# Patient Record
Sex: MALE | Race: WHITE | NOT HISPANIC OR LATINO | URBAN - METROPOLITAN AREA
[De-identification: names, ages, dates, MRNs, and addresses within clinical notes are randomized per-mention and may not be internally consistent; named-entity substitution may affect disease eponyms.]

---

## 2024-08-22 ENCOUNTER — APPOINTMENT (OUTPATIENT)
Dept: UROLOGY | Facility: CLINIC | Age: 74
End: 2024-08-22

## 2024-08-22 VITALS — WEIGHT: 171 LBS | HEIGHT: 70 IN | TEMPERATURE: 97.5 F | BODY MASS INDEX: 24.48 KG/M2

## 2024-08-22 DIAGNOSIS — C67.9 MALIGNANT NEOPLASM OF URINARY BLADDER, UNSPECIFIED SITE (MULTI): ICD-10-CM

## 2024-08-22 DIAGNOSIS — N40.0 BENIGN PROSTATIC HYPERPLASIA WITHOUT LOWER URINARY TRACT SYMPTOMS: ICD-10-CM

## 2024-08-22 DIAGNOSIS — Z79.2 PROPHYLACTIC ANTIBIOTIC: ICD-10-CM

## 2024-08-22 LAB
POC APPEARANCE, URINE: CLEAR
POC BILIRUBIN, URINE: NEGATIVE
POC BLOOD, URINE: NEGATIVE
POC COLOR, URINE: YELLOW
POC GLUCOSE, URINE: NEGATIVE MG/DL
POC KETONES, URINE: NEGATIVE MG/DL
POC LEUKOCYTES, URINE: NEGATIVE
POC NITRITE,URINE: NEGATIVE
POC PH, URINE: 7 PH
POC PROTEIN, URINE: NEGATIVE MG/DL
POC SPECIFIC GRAVITY, URINE: 1.01
POC UROBILINOGEN, URINE: 0.2 EU/DL

## 2024-08-22 PROCEDURE — 52000 CYSTOURETHROSCOPY: CPT | Performed by: UROLOGY

## 2024-08-22 PROCEDURE — 81003 URINALYSIS AUTO W/O SCOPE: CPT | Performed by: UROLOGY

## 2024-08-22 PROCEDURE — 99213 OFFICE O/P EST LOW 20 MIN: CPT | Performed by: UROLOGY

## 2024-08-22 RX ORDER — LANCETS 33 GAUGE
EACH MISCELLANEOUS
COMMUNITY
Start: 2024-08-21

## 2024-08-22 RX ORDER — ATORVASTATIN CALCIUM 20 MG/1
20 TABLET, FILM COATED ORAL DAILY
COMMUNITY

## 2024-08-22 RX ORDER — ALFUZOSIN HYDROCHLORIDE 10 MG/1
10 TABLET, EXTENDED RELEASE ORAL DAILY
COMMUNITY
End: 2024-08-23 | Stop reason: SDUPTHER

## 2024-08-22 RX ORDER — METFORMIN HYDROCHLORIDE 750 MG/1
1500 TABLET, EXTENDED RELEASE ORAL
COMMUNITY

## 2024-08-22 ASSESSMENT — PAIN SCALES - GENERAL: PAINLEVEL: 0-NO PAIN

## 2024-08-22 NOTE — PROGRESS NOTES
"  Patient is a 74 y.o. male presenting with a history of bladder cancer    SUBJECTIVE:  HPI   He feels well.  He continues alfuzosin for BPH.      No results found for: \"URINECULTURE\"     No past medical history on file.   No past surgical history on file.   No family history on file.   Social History     Socioeconomic History    Marital status:    Tobacco Use    Smoking status: Never    Smokeless tobacco: Never   Substance and Sexual Activity    Alcohol use: Not Currently    Drug use: Never     Social Determinants of Health     Financial Resource Strain: Low Risk  (7/13/2022)    Received from [facility]    Overall Financial Resource Strain (CARDIA)     Difficulty of Paying Living Expenses: Not very hard   Food Insecurity: No Food Insecurity (7/13/2022)    Received from [facility]    Hunger Vital Sign     Worried About Running Out of Food in the Last Year: Never true     Ran Out of Food in the Last Year: Never true   Transportation Needs: No Transportation Needs (7/13/2022)    Received from [facility]    PRAPARE - Transportation     Lack of Transportation (Medical): No     Lack of Transportation (Non-Medical): No   Physical Activity: Insufficiently Active (7/13/2022)    Received from [facility]    Exercise Vital Sign     Days of Exercise per Week: 4 days     Minutes of Exercise per Session: 20 min   Stress: No Stress Concern Present (7/13/2022)    Received from [facility]    [place] [employer] - Occupational Stress Questionnaire     Feeling of Stress : Not at all   Social Connections: Unknown (7/13/2022)    Received from [facility]    Social Connection and Isolation Panel [NHANES]     Frequency of Communication with Friends and Family: Three times a week     Frequency of Social Gatherings with Friends and Family: Once a week     Attends Shinto Services: Patient declined     Active Member of Clubs or Organizations: Yes     Attends Club or " Organization Meetings: More than 4 times per year     Marital Status:         Review of Systems   Constitutional: denies any unintentional weight loss or change in strength.  Integumentary: denies any rashes or pruritus.  Eyes: denies any double vision or eye pain.  Ear/Nose/Mouth/Throat: denies any nosebleeds or gum bleeds.  Cardiovascular: denies any chest pain or syncope.  Respiratory: denies hemoptysis.  Gastrointestinal: denies nausea or vomiting.  Musculoskeletal: denies muscle cramping or weakness.  Neurologic: denies convulsions or seizures.  Hematologic/Lymphatic: denies bleeding tendencies.  Endocrine: denies heat/cold intolerance.  All other systems have been reviewed and are negative unless otherwise noted in the HPI.    OBJECTIVE:  Visit Vitals  Temp 36.4 °C (97.5 °F)     Physical Exam   Constitutional: No obvious distress.  Eyes: Non-injected conjunctiva, sclera clear, EOMI.  Ears/Nose/Mouth/Throat: No obvious drainage per ears or nose.  Cardiovascular: Extremities are warm and well perfused. No edema, cyanosis or pallor.  Respiratory: No audible wheezing/stridor; respirations do not appear labored.  Gastrointestinal: Abdomen soft, not distended.  Musculoskeletal: Normal ROM of extremities.  Skin: No obvious rashes or open sores.  Neurologic: Alert and oriented, CN 2-12 grossly intact.  Psychiatric: Answers questions appropriately with normal affect.  Hematologic/Lymphatic/Immunologic: No obvious bruises or sites of spontaneous bleeding.  Genitourinary: No CVA tenderness, bladder not palpable.     Labs:  Lab Results   Component Value Date    WBC 3.9 (L) 02/04/2020    HGB 14.5 02/04/2020    HCT 44.2 02/04/2020     (L) 02/04/2020    CHOL 140 02/04/2020    TRIG 62 02/04/2020    HDL 43 02/04/2020    ALT 26 02/04/2020    AST 22 02/04/2020     02/04/2020    K 4.7 02/04/2020     02/04/2020    CREATININE 0.9 02/04/2020    BUN 15 02/04/2020    CO2 24 02/04/2020    PSA 2.1 02/04/2020     "HGBA1C 6.3 (H) 02/19/2024     No results found for: \"KPSAT\", \"KPSAP\"  IMAGING:        PROCEDURES:    Cystoscopy    Date/Time: 8/22/2024 4:13 PM    Performed by: Riaz Alvarez MD  Authorized by: Riaz Alvarez MD    Procedure - Bladder Cystoscopy:     Procedure details: cystoscopy    Comments:      No tumors or stones present.          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  He had focal atypia on biopsy subsequently, which resolved on repeat biopsy in May 2023.  He has been treated with BCG.    Cystoscopy today was negative.    He will be scheduled for 3 follow-up BCG treatments.    He will continue alfuzosin for BPH.  Prescription provided today.    All questions were answered to the patient’s satisfaction.  Patient agrees with the plan and wishes to proceed.  Follow-up will be scheduled appropriately.     Riaz Alvarez MD  "

## 2024-08-23 RX ORDER — CEPHALEXIN 500 MG/1
500 CAPSULE ORAL ONCE
Qty: 1 CAPSULE | Refills: 0 | Status: SHIPPED | OUTPATIENT
Start: 2024-08-23 | End: 2024-08-23

## 2024-08-23 RX ORDER — ALFUZOSIN HYDROCHLORIDE 10 MG/1
10 TABLET, EXTENDED RELEASE ORAL DAILY
Qty: 90 TABLET | Refills: 3 | Status: SHIPPED | OUTPATIENT
Start: 2024-08-23

## 2024-09-18 ENCOUNTER — APPOINTMENT (OUTPATIENT)
Dept: UROLOGY | Facility: CLINIC | Age: 74
End: 2024-09-18
Payer: MEDICARE

## 2024-09-18 VITALS — HEIGHT: 70 IN | WEIGHT: 168 LBS | BODY MASS INDEX: 24.05 KG/M2 | TEMPERATURE: 97 F

## 2024-09-18 DIAGNOSIS — N13.8 BPH WITH OBSTRUCTION/LOWER URINARY TRACT SYMPTOMS: Primary | ICD-10-CM

## 2024-09-18 DIAGNOSIS — N40.1 BPH WITH OBSTRUCTION/LOWER URINARY TRACT SYMPTOMS: Primary | ICD-10-CM

## 2024-09-18 LAB
POC APPEARANCE, URINE: CLEAR
POC BILIRUBIN, URINE: NEGATIVE
POC BLOOD, URINE: NEGATIVE
POC COLOR, URINE: YELLOW
POC GLUCOSE, URINE: NEGATIVE MG/DL
POC KETONES, URINE: NEGATIVE MG/DL
POC LEUKOCYTES, URINE: NEGATIVE
POC NITRITE,URINE: NEGATIVE
POC PH, URINE: 5.5 PH
POC PROTEIN, URINE: NEGATIVE MG/DL
POC SPECIFIC GRAVITY, URINE: <=1.005
POC UROBILINOGEN, URINE: 0.2 EU/DL

## 2024-09-18 ASSESSMENT — PAIN SCALES - GENERAL: PAINLEVEL: 0-NO PAIN

## 2024-09-18 NOTE — PROGRESS NOTES
Patient here for BCG 1/3 maintnance.  Patient denies UTI s/s.  Urine dipped I/O prior to administration of BCG.  Patient denies being on antibiotics or steroids.  Informed consent completed by provider prior to start of tx series.  Written education on BCG given to patient, along with nurse callback number.  Educated patient on 2 hour dwell time, and bleach protocol in toilet x 6 hours.  14F coude catheter inserted by sterile technique. Urine drained yellow about 100 ml.  50mg BCG reconstituted in 50 mL of normal saline administered intravesically through catheter.  Catheter removed after administration of BCG.  Patient tolerated procedure well.  NS  NDC: 6177-8775-50  Lot: [id number]  Expires: 5/1/25

## 2024-09-25 ENCOUNTER — APPOINTMENT (OUTPATIENT)
Dept: UROLOGY | Facility: CLINIC | Age: 74
End: 2024-09-25
Payer: MEDICARE

## 2024-09-25 DIAGNOSIS — C67.9 MALIGNANT NEOPLASM OF URINARY BLADDER, UNSPECIFIED SITE (MULTI): Primary | ICD-10-CM

## 2024-09-25 LAB
POC APPEARANCE, URINE: CLEAR
POC BILIRUBIN, URINE: NEGATIVE
POC BLOOD, URINE: NEGATIVE
POC COLOR, URINE: YELLOW
POC GLUCOSE, URINE: NEGATIVE MG/DL
POC KETONES, URINE: NEGATIVE MG/DL
POC LEUKOCYTES, URINE: NEGATIVE
POC NITRITE,URINE: NEGATIVE
POC PH, URINE: 6 PH
POC PROTEIN, URINE: NEGATIVE MG/DL
POC SPECIFIC GRAVITY, URINE: 1.02
POC UROBILINOGEN, URINE: 0.2 EU/DL

## 2024-09-25 PROCEDURE — 81003 URINALYSIS AUTO W/O SCOPE: CPT | Performed by: NURSE PRACTITIONER

## 2024-09-25 PROCEDURE — 51720 TREATMENT OF BLADDER LESION: CPT | Performed by: NURSE PRACTITIONER

## 2024-09-25 NOTE — PROGRESS NOTES
Patient here for BCG 2/3 maintnance.  Patient denies UTI s/s.  Urine dipped I/O prior to administration of BCG.  Patient denies being on antibiotics or steroids.  Informed consent completed by provider prior to start of tx series.  Written education on BCG given to patient, along with nurse callback number.  Educated patient on 2 hour dwell time, and bleach protocol in toilet x 6 hours.  14F coude catheter inserted by sterile technique. Urine drained yellow about 100 ml.  50mg BCG reconstituted in 50 mL of normal saline administered intravesically through catheter.  Catheter removed after administration of BCG.  Patient tolerated procedure well.  NS  NDC: 6526-2989-42  Lot: [id number]  Expires: 5/1/25

## 2024-10-02 ENCOUNTER — APPOINTMENT (OUTPATIENT)
Dept: UROLOGY | Facility: CLINIC | Age: 74
End: 2024-10-02
Payer: MEDICARE

## 2024-10-02 VITALS — TEMPERATURE: 97.7 F | WEIGHT: 166.6 LBS | HEIGHT: 70 IN | BODY MASS INDEX: 23.85 KG/M2

## 2024-10-02 DIAGNOSIS — C67.9 MALIGNANT NEOPLASM OF URINARY BLADDER, UNSPECIFIED SITE (MULTI): Primary | ICD-10-CM

## 2024-10-02 LAB
POC APPEARANCE, URINE: CLEAR
POC BILIRUBIN, URINE: NEGATIVE
POC BLOOD, URINE: NEGATIVE
POC COLOR, URINE: YELLOW
POC GLUCOSE, URINE: NEGATIVE MG/DL
POC KETONES, URINE: NEGATIVE MG/DL
POC LEUKOCYTES, URINE: NEGATIVE
POC NITRITE,URINE: NEGATIVE
POC PH, URINE: 5.5 PH
POC PROTEIN, URINE: ABNORMAL MG/DL
POC SPECIFIC GRAVITY, URINE: >=1.03
POC UROBILINOGEN, URINE: 0.2 EU/DL

## 2024-10-02 PROCEDURE — 81002 URINALYSIS NONAUTO W/O SCOPE: CPT | Performed by: PHYSICIAN ASSISTANT

## 2024-10-02 PROCEDURE — 51720 TREATMENT OF BLADDER LESION: CPT | Performed by: PHYSICIAN ASSISTANT

## 2024-10-02 ASSESSMENT — PAIN SCALES - GENERAL: PAINLEVEL: 0-NO PAIN

## 2024-10-03 NOTE — PROGRESS NOTES
Patient here for BCG 3/3 maintnance.  Patient denies UTI s/s.  Urine dipped I/O prior to administration of BCG.  Patient denies being on antibiotics or steroids.  Informed consent completed by provider prior to start of tx series.  Written education on BCG given to patient, along with nurse callback number.  Educated patient on 2 hour dwell time, and bleach protocol in toilet x 6 hours.  14F coude catheter inserted by sterile technique. Urine drained yellow about 50 ml.  50mg BCG reconstituted in 50 mL of normal saline administered intravesically through catheter.  Catheter removed after administration of BCG.  Patient tolerated procedure well.    Please schedule cystoscopy in 4-6 weeks     NS  NDC: 4715-6482-90  Lot: [id number]  Expires: 5/1/25

## 2024-11-07 ENCOUNTER — APPOINTMENT (OUTPATIENT)
Dept: UROLOGY | Facility: CLINIC | Age: 74
End: 2024-11-07
Payer: MEDICARE

## 2024-11-07 VITALS
HEIGHT: 68 IN | HEART RATE: 57 BPM | TEMPERATURE: 97.2 F | SYSTOLIC BLOOD PRESSURE: 160 MMHG | DIASTOLIC BLOOD PRESSURE: 72 MMHG | WEIGHT: 168.2 LBS | BODY MASS INDEX: 25.49 KG/M2

## 2024-11-07 DIAGNOSIS — C67.9 MALIGNANT NEOPLASM OF URINARY BLADDER, UNSPECIFIED SITE (MULTI): ICD-10-CM

## 2024-11-07 LAB
POC APPEARANCE, URINE: CLEAR
POC BILIRUBIN, URINE: NEGATIVE
POC BLOOD, URINE: NEGATIVE
POC COLOR, URINE: YELLOW
POC GLUCOSE, URINE: NEGATIVE MG/DL
POC KETONES, URINE: NEGATIVE MG/DL
POC LEUKOCYTES, URINE: NEGATIVE
POC NITRITE,URINE: NEGATIVE
POC PH, URINE: 5.5 PH
POC PROTEIN, URINE: NEGATIVE MG/DL
POC SPECIFIC GRAVITY, URINE: 1.02
POC UROBILINOGEN, URINE: 0.2 EU/DL

## 2024-11-07 PROCEDURE — 81003 URINALYSIS AUTO W/O SCOPE: CPT | Performed by: UROLOGY

## 2024-11-07 PROCEDURE — 88112 CYTOPATH CELL ENHANCE TECH: CPT | Performed by: PATHOLOGY

## 2024-11-07 PROCEDURE — 88112 CYTOPATH CELL ENHANCE TECH: CPT

## 2024-11-07 PROCEDURE — 52000 CYSTOURETHROSCOPY: CPT | Performed by: UROLOGY

## 2024-11-07 ASSESSMENT — PAIN SCALES - GENERAL: PAINLEVEL_OUTOF10: 0-NO PAIN

## 2024-11-07 NOTE — PATIENT INSTRUCTIONS
Please call Radiology scheduling at [phone]  Your provider has ordered blood work to be drawn.  Please obtain your labs at any  facility.

## 2024-11-07 NOTE — PROGRESS NOTES
"  Patient is a 74 y.o. male presenting with a history of bladder cancer    SUBJECTIVE:  HPI   He feels well.    He finished BCG.  He continues alfuzosin for BPH.      No results found for: \"URINECULTURE\"     No past medical history on file.   No past surgical history on file.   No family history on file.   Social History     Socioeconomic History    Marital status:    Tobacco Use    Smoking status: Never    Smokeless tobacco: Never   Substance and Sexual Activity    Alcohol use: Not Currently    Drug use: Never     Social Drivers of Health     Financial Resource Strain: Low Risk  (7/13/2022)    Received from [facility]    Overall Financial Resource Strain (CARDIA)     Difficulty of Paying Living Expenses: Not very hard   Food Insecurity: No Food Insecurity (7/13/2022)    Received from [facility]    Hunger Vital Sign     Worried About Running Out of Food in the Last Year: Never true     Ran Out of Food in the Last Year: Never true   Transportation Needs: No Transportation Needs (7/13/2022)    Received from [facility]    PRAPARE - Transportation     Lack of Transportation (Medical): No     Lack of Transportation (Non-Medical): No   Physical Activity: Insufficiently Active (7/13/2022)    Received from [facility]    Exercise Vital Sign     Days of Exercise per Week: 4 days     Minutes of Exercise per Session: 20 min   Stress: No Stress Concern Present (7/13/2022)    Received from [facility]    [employer] [place] [employer]ational [employer] - Occupational Stress Questionnaire     Feeling of Stress : Not at all   Social Connections: Unknown (7/13/2022)    Received from [facility]    Social Connection and Isolation Panel [NHANES]     Frequency of Communication with Friends and Family: Three times a week     Frequency of Social Gatherings with Friends and Family: Once a week     Attends Caodaism Services: Patient declined     Active Member of Clubs or Organizations: Yes     Attends Club " or Organization Meetings: More than 4 times per year     Marital Status:         Review of Systems   Constitutional: denies any unintentional weight loss or change in strength.  Integumentary: denies any rashes or pruritus.  Eyes: denies any double vision or eye pain.  Ear/Nose/Mouth/Throat: denies any nosebleeds or gum bleeds.  Cardiovascular: denies any chest pain or syncope.  Respiratory: denies hemoptysis.  Gastrointestinal: denies nausea or vomiting.  Musculoskeletal: denies muscle cramping or weakness.  Neurologic: denies convulsions or seizures.  Hematologic/Lymphatic: denies bleeding tendencies.  Endocrine: denies heat/cold intolerance.  All other systems have been reviewed and are negative unless otherwise noted in the HPI.    OBJECTIVE:  Visit Vitals  /72   Pulse 57   Temp 36.2 °C (97.2 °F)     Physical Exam   Constitutional: No obvious distress.  Eyes: Non-injected conjunctiva, sclera clear, EOMI.  Ears/Nose/Mouth/Throat: No obvious drainage per ears or nose.  Cardiovascular: Extremities are warm and well perfused. No edema, cyanosis or pallor.  Respiratory: No audible wheezing/stridor; respirations do not appear labored.  Gastrointestinal: Abdomen soft, not distended.  Musculoskeletal: Normal ROM of extremities.  Skin: No obvious rashes or open sores.  Neurologic: Alert and oriented, CN 2-12 grossly intact.  Psychiatric: Answers questions appropriately with normal affect.  Hematologic/Lymphatic/Immunologic: No obvious bruises or sites of spontaneous bleeding.  Genitourinary: No CVA tenderness, bladder not palpable.     Labs:  Lab Results   Component Value Date    WBC 3.9 (L) 02/04/2020    HGB 14.5 02/04/2020    HCT 44.2 02/04/2020     (L) 02/04/2020    CHOL 140 02/04/2020    TRIG 62 02/04/2020    HDL 43 02/04/2020    ALT 26 02/04/2020    AST 22 02/04/2020     02/04/2020    K 4.7 02/04/2020     02/04/2020    CREATININE 0.9 02/04/2020    BUN 15 02/04/2020    CO2 24  "02/04/2020    PSA 2.1 02/04/2020    HGBA1C 6.3 (H) 02/19/2024     No results found for: \"KPSAT\", \"KPSAP\"  IMAGING:        PROCEDURES:    Cystoscopy    Date/Time: 11/10/2024 4:30 PM    Performed by: Riaz Alvarez MD  Authorized by: Riaz Alvarez MD      Comments:      No bladder tumors or stones           ASSESSMENT/PLAN:  Problem List Items Addressed This Visit    None  Visit Diagnoses       Malignant neoplasm of urinary bladder, unspecified site (Multi)        Relevant Orders    POCT UA Automated manually resulted (Completed)           He has a history of a high-grade TA urothelial carcinoma of the bladder diagnosed in May 2021.  He had focal atypia on biopsy subsequently, which resolved on repeat biopsy in May 2023.  He has been treated with BCG.   A CT urogram was ordered.  Cytology sent.    Cystoscopy today was negative.    He will continue alfuzosin for BPH.  Monitor obstructive symptoms    All questions were answered to the patient’s satisfaction.  Patient agrees with the plan and wishes to proceed.  Follow-up will be scheduled appropriately.     Riaz Alvarez MD  "

## 2024-11-11 LAB
LABORATORY COMMENT REPORT: NORMAL
LABORATORY COMMENT REPORT: NORMAL
PATH REPORT.FINAL DX SPEC: NORMAL
PATH REPORT.GROSS SPEC: NORMAL
PATH REPORT.RELEVANT HX SPEC: NORMAL
PATH REPORT.TOTAL CANCER: NORMAL

## 2024-12-06 ENCOUNTER — HOSPITAL ENCOUNTER (OUTPATIENT)
Dept: RADIOLOGY | Facility: HOSPITAL | Age: 74
Discharge: HOME | End: 2024-12-06
Payer: MEDICARE

## 2024-12-06 ENCOUNTER — TELEPHONE (OUTPATIENT)
Dept: UROLOGY | Facility: HOSPITAL | Age: 74
End: 2024-12-06
Payer: MEDICARE

## 2024-12-06 DIAGNOSIS — C67.9 MALIGNANT NEOPLASM OF BLADDER, UNSPECIFIED: ICD-10-CM

## 2024-12-06 DIAGNOSIS — C67.9 MALIGNANT NEOPLASM OF BLADDER, UNSPECIFIED: Primary | ICD-10-CM

## 2024-12-06 DIAGNOSIS — C67.9 MALIGNANT NEOPLASM OF URINARY BLADDER, UNSPECIFIED SITE (MULTI): ICD-10-CM

## 2024-12-06 LAB
CREAT SERPL-MCNC: 0.89 MG/DL (ref 0.5–1.3)
EGFRCR SERPLBLD CKD-EPI 2021: 90 ML/MIN/1.73M*2

## 2024-12-06 PROCEDURE — 82565 ASSAY OF CREATININE: CPT | Performed by: UROLOGY

## 2024-12-06 PROCEDURE — 76377 3D RENDER W/INTRP POSTPROCES: CPT

## 2024-12-06 PROCEDURE — 36415 COLL VENOUS BLD VENIPUNCTURE: CPT | Performed by: UROLOGY

## 2024-12-06 PROCEDURE — 2550000001 HC RX 255 CONTRASTS: Performed by: UROLOGY

## 2024-12-06 NOTE — TELEPHONE ENCOUNTER
We discussed his CT.  He will follow up with GI.  He will return here in 2/25 for repeat cystoscopy and cytology.

## 2025-02-25 NOTE — PROGRESS NOTES
"  Patient is a 74 y.o. male presenting with a history of bladder cancer    SUBJECTIVE:  HPI   He presents for surveillance cystoscopy.  He finished BCG.  He continues alfuzosin for BPH.  He has no urinary complaints today.     He has colon cancer incidentally found on CT urogram in 12/24.   He underwent laparoscopic partial colectomy with anastomosis on 1/21/25.  He is currently participating in a study.    No past medical history on file.   No past surgical history on file.   No family history on file.     Review of Systems     OBJECTIVE:  Visit Vitals  /71   Pulse 71   Temp 36.4 °C (97.6 °F)       Physical Exam     Labs:  Lab Results   Component Value Date    WBC 3.9 (L) 02/04/2020    HGB 14.5 02/04/2020    HCT 44.2 02/04/2020    MCV 93.4 02/04/2020     (L) 02/04/2020   PSA  10/10/24  2.29  2/19/24  2.68  Lab Results   Component Value Date    PSA 2.1 02/04/2020     No results found for: \"URINECULTURE\"    IMAGING:   === 12/06/24 ===  CT UROGRAPHY WITH 3D VOLUME RENDERED IMAGING  - Impression -  1.  Incidental enhancing process inseparable from the expected  location of the cecum. The finding is suspicious for a cecal region  mass. Further evaluation recommended.  2. No evident urinary bladder mass.  3. Probably benign kidney cysts.  4. Gallstone.    Signed by: Ike Paniagua 12/6/2024 2:14 PM  Dictation workstation:   [id number]     PROCEDURES:  Cystoscopy    Date/Time: 2/27/2025 10:52 AM    Performed by: Riaz Alvarez MD  Authorized by: Riaz Alvarez MD    Procedure - Bladder Cystoscopy:     Procedure details: cystoscopy    Post-procedure:     Patient tolerance: Patient tolerated the procedure well with no immediate complications      Comments:      Urethra: No urethral lesions.  Bladder: No bladder tumors or stones.       ASSESSMENT/PLAN:  Problem List Items Addressed This Visit    None  Visit Diagnoses       Urinary symptom or sign    -  Primary    Relevant Orders    POCT UA Automated " manually resulted (Completed)    Malignant neoplasm of urinary bladder, unspecified site (Multi)              He has a history of a high-grade TA urothelial carcinoma of the bladder diagnosed in May 2021.  He had focal atypia on biopsy subsequently, which resolved on repeat biopsy in May 2023.  He has been treated with BCG. His cystoscopy was negative today 2/27/25. Urine will be sent for cytology. He will follow up in 3 months for repeat cystoscopy.   He will take 2 doses of Keflex for prophylaxis.    He will continue alfuzosin for BPH.  Monitor obstructive symptoms    All questions were answered to the patient’s satisfaction.  Patient agrees with the plan and wishes to proceed.  Follow-up will be scheduled appropriately.     Scribe Attestation  By signing my name below, I, Cristi Eaton,   attest that this documentation has been prepared under the direction and in the presence of Riaz Alvarez MD.

## 2025-02-27 ENCOUNTER — APPOINTMENT (OUTPATIENT)
Dept: UROLOGY | Facility: CLINIC | Age: 75
End: 2025-02-27
Payer: MEDICARE

## 2025-02-27 VITALS
TEMPERATURE: 97.6 F | DIASTOLIC BLOOD PRESSURE: 71 MMHG | WEIGHT: 156 LBS | SYSTOLIC BLOOD PRESSURE: 135 MMHG | HEART RATE: 71 BPM | HEIGHT: 69 IN | BODY MASS INDEX: 23.11 KG/M2

## 2025-02-27 DIAGNOSIS — Z85.51 HISTORY OF BLADDER CANCER: ICD-10-CM

## 2025-02-27 DIAGNOSIS — R39.12 BENIGN PROSTATIC HYPERPLASIA WITH WEAK URINARY STREAM: ICD-10-CM

## 2025-02-27 DIAGNOSIS — N40.1 BENIGN PROSTATIC HYPERPLASIA WITH WEAK URINARY STREAM: ICD-10-CM

## 2025-02-27 DIAGNOSIS — R39.9 URINARY SYMPTOM OR SIGN: ICD-10-CM

## 2025-02-27 DIAGNOSIS — C67.9 MALIGNANT NEOPLASM OF URINARY BLADDER, UNSPECIFIED SITE (MULTI): Primary | ICD-10-CM

## 2025-02-27 PROCEDURE — 88112 CYTOPATH CELL ENHANCE TECH: CPT | Performed by: PATHOLOGY

## 2025-02-27 PROCEDURE — 52000 CYSTOURETHROSCOPY: CPT | Performed by: UROLOGY

## 2025-02-27 PROCEDURE — 81003 URINALYSIS AUTO W/O SCOPE: CPT | Performed by: UROLOGY

## 2025-02-27 PROCEDURE — 88112 CYTOPATH CELL ENHANCE TECH: CPT

## 2025-02-27 ASSESSMENT — PAIN SCALES - GENERAL: PAINLEVEL_OUTOF10: 0-NO PAIN

## 2025-05-30 NOTE — PROGRESS NOTES
Patient is a 74 y.o. male presenting for surveillance cystoscopy with a PMH of high-grade TA urothelial carcinoma of the bladder.    SUBJECTIVE:  HPI   He presents for surveillance cystoscopy.  He finished BCG.  He continues alfuzosin for BPH.    He has colon cancer incidentally found on CT urogram in 12/24.   He underwent laparoscopic partial colectomy with anastomosis on 1/21/25.  He is currently participating in a study.    Medical History[1]  Surgical History[2]  Family History[3]  Social History[4]    Review of Systems   All systems have been reviewed and are negative unless otherwise noted in the HPI.    OBJECTIVE:  Visit Vitals  /55 (BP Location: Left arm, Patient Position: Sitting, BP Cuff Size: Adult)   Pulse 61   Temp 36.4 °C (97.5 °F)     Physical Exam   Constitutional: No obvious distress.  Cardiovascular: Extremities are warm and well perfused. No edema, cyanosis or pallor.  Respiratory: No audible wheezing/stridor; respirations do not appear labored.  Neurologic: Alert and oriented, CN 2-12 grossly intact.  Psychiatric: Answers questions appropriately with normal affect.    Labs:  Lab Results   Component Value Date    WBC 3.9 (L) 02/04/2020    HGB 14.5 02/04/2020    HCT 44.2 02/04/2020    MCV 93.4 02/04/2020     (L) 02/04/2020    GLUCOSE 193 (H) 02/04/2020    CALCIUM 9.9 02/04/2020     02/04/2020    K 4.7 02/04/2020    CO2 24 02/04/2020     02/04/2020    BUN 15 02/04/2020    CREATININE 0.89 12/06/2024    EGFR 90 12/06/2024    PSA 2.1 02/04/2020   Urine cytology 2/27/25  Final Cytological Interpretation      A. URINE CLEAN CATCH                 Atypical urothelial cells are present                Polyoma virus cytopathic changes noted      Electronically signed by Ary Brewer MD on 3/3/2025 at 1415 EST     IMAGING:  Contrasted CT AP 02/11/25  IMPRESSION:   -No abdominal mass density or lymphadenopathy   -Postsurgical changes consistent with partial right colectomy.  Slight    thickening in the small bowel wall at the area of anastomosis   -Diverticulosis   -Gallstones/no biliary dilatation   -Prostate prominence   -Slight bladder wall thickening/correlate with clinical history      === 12/06/24 ===  CT UROGRAPHY WITH 3D VOLUME RENDERED IMAGING  - Impression -  1.  Incidental enhancing process inseparable from the expected  location of the cecum. The finding is suspicious for a cecal region  mass. Further evaluation recommended.  2. No evident urinary bladder mass.  3. Probably benign kidney cysts.  4. Gallstone.    PROCEDURES:  Cystoscopy    Date/Time: 6/2/2025 9:54 AM    Performed by: iRaz Alvarez MD  Authorized by: Riaz Alvarez MD    Procedure - Bladder Cystoscopy:     Procedure details: cystoscopy    Post-procedure:     Patient tolerance: Patient tolerated the procedure well with no immediate complications      Comments:      Risks of infection and bleeding reviewed.  Urethra: normal course and caliber, no evidence of stricture or lesion.  Prostate: enlarged lateral lobes of the prostate with some extension into the bladder.  Bladder: normal capacity, no trabeculations, no diverticulum, no stone, tumors or other lesions  Keflex given for prophylaxis.      ASSESSMENT/PLAN:  Problem List Items Addressed This Visit    None  Visit Diagnoses         Malignant neoplasm of urinary bladder, unspecified site (Multi)    -  Primary      Urinary symptom or sign        Relevant Orders    POCT UA Automated manually resulted (Completed)          He has a history of a high-grade TA urothelial carcinoma of the bladder diagnosed in May 2021.  He had focal atypia on biopsy subsequently, which resolved on repeat biopsy in May 2023.  Urine cytology identified atypia in 11/24 and 2/25. He has been treated with BCG. Cystoscopy was negative 2/25 and today (6/2/25). He will followup in 3 months for repeat cystoscopy. Urine will be sent for cytology.    He has history of BPH, treated with  alfuzosin.  There was enlargement of the lateral lobes with some extension into the bladder on cystoscopy today. Monitor obstructive symptoms.     He is followed for prostate cancer screening.  PSA summary  10/10/24 2.29  02/19/24 2.68  02/04/20 2.1     All questions were answered to the patient’s satisfaction.  Patient agrees with the plan and wishes to proceed.  Follow-up will be scheduled appropriately.     Scribe Attestation  By signing my name below, I, Cristi Eaton,   attest that this documentation has been prepared under the direction and in the presence of Riaz Alvarez MD.          [1] No past medical history on file.  [2] No past surgical history on file.  [3] No family history on file.  [4]   Social History  Tobacco Use    Smoking status: Never    Smokeless tobacco: Never   Substance Use Topics    Alcohol use: Not Currently    Drug use: Never

## 2025-06-02 ENCOUNTER — APPOINTMENT (OUTPATIENT)
Dept: UROLOGY | Facility: CLINIC | Age: 75
End: 2025-06-02
Payer: MEDICARE

## 2025-06-02 VITALS
SYSTOLIC BLOOD PRESSURE: 141 MMHG | WEIGHT: 156 LBS | HEART RATE: 61 BPM | BODY MASS INDEX: 23.11 KG/M2 | DIASTOLIC BLOOD PRESSURE: 55 MMHG | TEMPERATURE: 97.5 F | HEIGHT: 69 IN

## 2025-06-02 DIAGNOSIS — C67.9 MALIGNANT NEOPLASM OF URINARY BLADDER, UNSPECIFIED SITE (MULTI): Primary | ICD-10-CM

## 2025-06-02 DIAGNOSIS — R39.9 URINARY SYMPTOM OR SIGN: ICD-10-CM

## 2025-06-02 PROCEDURE — 81003 URINALYSIS AUTO W/O SCOPE: CPT | Performed by: UROLOGY

## 2025-06-02 PROCEDURE — 88112 CYTOPATH CELL ENHANCE TECH: CPT | Performed by: STUDENT IN AN ORGANIZED HEALTH CARE EDUCATION/TRAINING PROGRAM

## 2025-06-02 PROCEDURE — 52000 CYSTOURETHROSCOPY: CPT | Performed by: UROLOGY

## 2025-06-02 PROCEDURE — 88112 CYTOPATH CELL ENHANCE TECH: CPT

## 2025-06-02 ASSESSMENT — PAIN SCALES - GENERAL: PAINLEVEL_OUTOF10: 0-NO PAIN

## 2025-06-13 DIAGNOSIS — N40.0 BENIGN PROSTATIC HYPERPLASIA WITHOUT LOWER URINARY TRACT SYMPTOMS: ICD-10-CM

## 2025-06-13 RX ORDER — ALFUZOSIN HYDROCHLORIDE 10 MG/1
10 TABLET, EXTENDED RELEASE ORAL DAILY
Qty: 90 TABLET | Refills: 3 | Status: SHIPPED | OUTPATIENT
Start: 2025-06-13

## 2025-09-04 ENCOUNTER — PROCEDURE VISIT (OUTPATIENT)
Dept: UROLOGY | Facility: CLINIC | Age: 75
End: 2025-09-04
Payer: MEDICARE

## 2025-09-04 VITALS
WEIGHT: 156 LBS | HEIGHT: 69 IN | HEART RATE: 71 BPM | TEMPERATURE: 96.1 F | BODY MASS INDEX: 23.11 KG/M2 | RESPIRATION RATE: 18 BRPM | DIASTOLIC BLOOD PRESSURE: 66 MMHG | SYSTOLIC BLOOD PRESSURE: 140 MMHG

## 2025-09-04 DIAGNOSIS — N40.1 BENIGN PROSTATIC HYPERPLASIA WITH WEAK URINARY STREAM: ICD-10-CM

## 2025-09-04 DIAGNOSIS — C67.9 MALIGNANT NEOPLASM OF URINARY BLADDER, UNSPECIFIED SITE (MULTI): Primary | ICD-10-CM

## 2025-09-04 DIAGNOSIS — R39.12 BENIGN PROSTATIC HYPERPLASIA WITH WEAK URINARY STREAM: ICD-10-CM

## 2025-09-04 DIAGNOSIS — Z79.2 PROPHYLACTIC ANTIBIOTIC: ICD-10-CM

## 2025-09-04 DIAGNOSIS — Z12.5 PROSTATE CANCER SCREENING: ICD-10-CM

## 2025-09-04 PROCEDURE — 81002 URINALYSIS NONAUTO W/O SCOPE: CPT | Performed by: UROLOGY

## 2025-09-04 PROCEDURE — 99214 OFFICE O/P EST MOD 30 MIN: CPT | Performed by: UROLOGY

## 2025-09-04 PROCEDURE — 52000 CYSTOURETHROSCOPY: CPT | Performed by: UROLOGY

## 2025-09-04 RX ORDER — CEPHALEXIN 500 MG/1
500 CAPSULE ORAL ONCE
Status: COMPLETED | OUTPATIENT
Start: 2025-09-04 | End: 2025-09-04

## 2025-09-04 RX ADMIN — CEPHALEXIN 500 MG: 500 CAPSULE ORAL at 11:02

## 2025-09-04 ASSESSMENT — PAIN SCALES - GENERAL: PAINLEVEL_OUTOF10: 0-NO PAIN

## 2025-09-08 ENCOUNTER — APPOINTMENT (OUTPATIENT)
Dept: UROLOGY | Facility: CLINIC | Age: 75
End: 2025-09-08
Payer: MEDICARE

## 2026-03-09 ENCOUNTER — APPOINTMENT (OUTPATIENT)
Dept: UROLOGY | Facility: CLINIC | Age: 76
End: 2026-03-09
Payer: MEDICARE